# Patient Record
Sex: MALE
[De-identification: names, ages, dates, MRNs, and addresses within clinical notes are randomized per-mention and may not be internally consistent; named-entity substitution may affect disease eponyms.]

---

## 2019-11-11 ENCOUNTER — APPOINTMENT (OUTPATIENT)
Dept: HEART AND VASCULAR | Facility: CLINIC | Age: 54
End: 2019-11-11
Payer: COMMERCIAL

## 2019-11-11 ENCOUNTER — NON-APPOINTMENT (OUTPATIENT)
Age: 54
End: 2019-11-11

## 2019-11-11 VITALS
DIASTOLIC BLOOD PRESSURE: 80 MMHG | RESPIRATION RATE: 12 BRPM | BODY MASS INDEX: 31.08 KG/M2 | HEART RATE: 60 BPM | SYSTOLIC BLOOD PRESSURE: 132 MMHG | WEIGHT: 250 LBS | HEIGHT: 75 IN

## 2019-11-11 DIAGNOSIS — Z83.3 FAMILY HISTORY OF DIABETES MELLITUS: ICD-10-CM

## 2019-11-11 DIAGNOSIS — R01.1 CARDIAC MURMUR, UNSPECIFIED: ICD-10-CM

## 2019-11-11 DIAGNOSIS — Z87.891 PERSONAL HISTORY OF NICOTINE DEPENDENCE: ICD-10-CM

## 2019-11-11 DIAGNOSIS — Z78.9 OTHER SPECIFIED HEALTH STATUS: ICD-10-CM

## 2019-11-11 DIAGNOSIS — R07.9 CHEST PAIN, UNSPECIFIED: ICD-10-CM

## 2019-11-11 DIAGNOSIS — Z82.49 FAMILY HISTORY OF ISCHEMIC HEART DISEASE AND OTHER DISEASES OF THE CIRCULATORY SYSTEM: ICD-10-CM

## 2019-11-11 PROBLEM — Z00.00 ENCOUNTER FOR PREVENTIVE HEALTH EXAMINATION: Status: ACTIVE | Noted: 2019-11-11

## 2019-11-11 PROCEDURE — 99204 OFFICE O/P NEW MOD 45 MIN: CPT

## 2019-11-11 PROCEDURE — 93306 TTE W/DOPPLER COMPLETE: CPT

## 2019-11-11 PROCEDURE — 93000 ELECTROCARDIOGRAM COMPLETE: CPT

## 2019-11-13 ENCOUNTER — TRANSCRIPTION ENCOUNTER (OUTPATIENT)
Age: 54
End: 2019-11-13

## 2019-11-18 ENCOUNTER — TRANSCRIPTION ENCOUNTER (OUTPATIENT)
Age: 54
End: 2019-11-18

## 2019-11-18 NOTE — PHYSICAL EXAM
[General Appearance - Well Developed] : well developed [Normal Appearance] : normal appearance [No Deformities] : no deformities [General Appearance - Well Nourished] : well nourished [Well Groomed] : well groomed [General Appearance - In No Acute Distress] : no acute distress [Normal Oral Mucosa] : normal oral mucosa [No Oral Pallor] : no oral pallor [Normal Jugular Venous A Waves Present] : normal jugular venous A waves present [Normal Jugular Venous V Waves Present] : normal jugular venous V waves present [No Oral Cyanosis] : no oral cyanosis [Normal] : normal [No Jugular Venous Jarrell A Waves] : no jugular venous jarrell A waves [5th Left ICS - MCL] : palpated at the 5th LICS in the midclavicular line [Rhythm Regular] : regular [Normal Rate] : normal [Normal S1] : normal S1 [III] : a grade 3 [Normal S2] : normal S2 [No Pitting Edema] : no pitting edema present [Auscultation Breath Sounds / Voice Sounds] : lungs were clear to auscultation bilaterally [Exaggerated Use Of Accessory Muscles For Inspiration] : no accessory muscle use [Respiration, Rhythm And Depth] : normal respiratory rhythm and effort [Abdomen Tenderness] : non-tender [Abdomen Soft] : soft [Gait - Sufficient For Exercise Testing] : the gait was sufficient for exercise testing [Abdomen Mass (___ Cm)] : no abdominal mass palpated [Abnormal Walk] : normal gait [Nail Clubbing] : no clubbing of the fingernails [Cyanosis, Localized] : no localized cyanosis [Petechial Hemorrhages (___cm)] : no petechial hemorrhages [Affect] : the affect was normal [Oriented To Time, Place, And Person] : oriented to person, place, and time [] : no ischemic changes [No Anxiety] : not feeling anxious [Mood] : the mood was normal [Right Carotid Bruit] : no bruit heard over the right carotid [Left Carotid Bruit] : no bruit heard over the left carotid

## 2019-11-18 NOTE — HISTORY OF PRESENT ILLNESS
[FreeTextEntry1] : 54-year-old male with no known past medical history comes in for evaluation of chest pain. Patient reports a few months of chest discomfort described as a light pressure nonradiating no associated shortness of breath diaphoresis or nausea. No exertional symptoms.

## 2019-11-18 NOTE — DISCUSSION/SUMMARY
[FreeTextEntry1] : 1. Chest pain: Atypical; will need to rule out ischemic heart disease in this male with new-onset chest pain. Will advise once results of an ETT is known. EKG reviewed normal sinus rhythm nonspecific ST-T wave abnormalities.\par 2. Cardiac murmur: Echocardiogram\par \par addendum: suggest sleep study,

## 2019-12-02 ENCOUNTER — APPOINTMENT (OUTPATIENT)
Dept: HEART AND VASCULAR | Facility: CLINIC | Age: 54
End: 2019-12-02